# Patient Record
Sex: MALE | Race: WHITE
[De-identification: names, ages, dates, MRNs, and addresses within clinical notes are randomized per-mention and may not be internally consistent; named-entity substitution may affect disease eponyms.]

---

## 2017-07-15 NOTE — EDPHY
H & P


Time Seen by Provider: 07/15/17 16:47


HPI/ROS: 


CHIEF COMPLAINT:  Groin abscess 





HISTORY OF PRESENT ILLNESS: 


This patient is a 27 year old male with diabetes presenting with an abscess to 

his right groin area.  Onset of tenderness and swelling on the right side of 

his groin 1 week ago, gradually increasing since then.  The pain is moderate 

and increases with palpation.  Associated with small amount of drainage. No 

fever, vomiting, or other associated symptoms. Tetanus is up-to-date.





REVIEW OF SYSTEMS:


Constitutional: No fever, no chills


Eyes: No visual changes


ENT: No sore throat


Respiratory: No cough, no shortness of breath


Cardiac: No chest pain


Gastrointestinal: No nausea, no vomiting, no abdominal pain


Genitourinary: No hematuria, no dysuria


Musculoskeletal: No leg pain or swelling


Skin: No rash


Neurological: No headache, no weakness


Psychiatric: No depression





Past Medical/Surgical History: 


1. Diabetes


2. Hyperlipidemia


3. Depression


4. Anxiety 


Social History: 


Recently moved from [place]. 


Smoking Status: Current some day smoker


Physical Exam: 


General Appearance: Alert, pleasant


Respiratory:  Normal respiratory rate


Cardiovascular: Regular rate and rhythm 


Gastrointestinal:  Abdomen is soft and non-tender 


Genitourinary: 3cm abscess to right groin adjacent to the scrotum. 


Neurological:  A&O, nonfocal, normal gait


Skin:  Warm and dry, no rash


Extremities:  Normal inspection


Psychiatric:  Anxious





Constitutional: 


 Initial Vital Signs











Temperature (C)  36.8 C   07/15/17 16:23


 


Heart Rate  114 H  07/15/17 16:23


 


Respiratory Rate  20   07/15/17 16:23


 


Blood Pressure  140/89 H  07/15/17 16:23


 


O2 Sat (%)  95   07/15/17 16:23








 











O2 Delivery Mode               Room Air














Allergies/Adverse Reactions: 


 





No Known Allergies Allergy (Unverified 07/15/17 16:20)


 








Home Medications: 














 Medication  Instructions  Recorded


 


Adderall Xr 20 mg Capsule  07/15/17


 


Effexor Xr  07/15/17


 


KLONOPIN  07/15/17


 


Levemir  07/15/17


 


Lipitor 40 mg (*)  07/15/17


 


Metformin 1000 mg  07/15/17


 


Sulfamethox/Tmp 800/160 mg 1 tab PO BID #14 tab 07/15/17





[Bactrim Ds]  


 


Wellbutrin Sr  07/15/17


 


novoLOG  07/15/17














Medical Decision Making


Procedures: 


Procedure: Incision and Drainage abscess.





The patient's 3cm abscess was located on the right groin area. Risks, benefits, 

alternatives discussed with the patient and consent obtained. The area was 

prepped and draped in sterile fashion. The patient received local anesthesia 

with 1% lidocaine with epinephrine. The abscess was incised with a #11 blade 

and purulent drainage was expressed. The wound was packed. The patient 

tolerated the procedure well. The procedure was performed by myself.


ED Course/Re-evaluation: 


27 year old male presents with 3cm abscess to right groin area adjacent to 

scrotum. Plan for I&D. 





Patient tolerated the procedure well. He will be discharged home in good 

condition with prescription for Bactrim. Follow up and return precautions 

discussed. Patient questions answered. He is comfortable with this plan. 





Departure





- Departure


Disposition: Home, Routine, Self-Care


Clinical Impression: 


 Abscess





Condition: Good


Instructions:  Abscess (ED)


Additional Instructions: 


1. Take your Bactrim as prescribed. It is important that you finish your entire 

course of antibiotics. 





2. You may remove the packing in two days, or follow up at the emergency 

department or with a primary care provider for packing removal. We have 

referred you to our primary care provider on call. 





3. Return if you develop increasing pain, fever, or any other worsening of 

condition.





Referrals: 


CHRISTEN CRAFT [Other] - As per Instructions


Eric Little MD [Medical Doctor] - As per Instructions (Follow-up in 2 

days.)


Prescriptions: 


Sulfamethox/Tmp 800/160 mg [Bactrim Ds] 1 tab PO BID #14 tab


Report Scribed for: Ella Goodwin


Report Scribed by: Deepthi Sanchez


Date of Report: 07/15/17


Time of Report: 17:12


Physician Review and Approval Statement: 





07/15/17 17:12


Portions of this note were transcribed by a medical scribe.  I personally 

performed a history, physical exam, medical decision making, and confirmed 

accuracy of information the transcribed note.

## 2017-07-17 NOTE — EDPHY
H & P


Stated Complaint: here for wound recheck and packing removal of abcess


HPI/ROS: 





CHIEF COMPLAINT:  Right groin abscess recheck





HISTORY OF PRESENT ILLNESS:  Patient presents for recheck of a right groin 

abscess that was incised and drained 2 days ago.  There is packing in place.  

He has had some drainage that is purulent.  This is minimal amount.  No 

erythema.  His pain is significantly improved in the area.  There is no 

testicular pain.  No pain with urination.  No fever or chills.  He is diabetic 

on insulin and his blood sugars have been 180 to low 200s.  He has had no fever 

or chills. He has no associated complaints or modifying factors otherwise.





REVIEW OF SYSTEMS:


Ten systems reviewed and are negative unless otherwise noted in the HPI





PAST MEDICAL HISTORY:  Insulin-dependent diabetes





SOCIAL HISTORY:  Nonsmoker





FAMILY HISTORY:  Noncontributory





EXAMINATION


General Appearance:  Alert, no distress


Head: normocephalic, atraumatic


Gastrointestinal:  Abdomen is soft and nontender


Skin:  Warm and dry, no rash.  Right groin abscess that is status post incision 

and drainage with iodoform packing in place.  There is no fluctuance.  No 

surrounding erythema or edema.  No induration.  Minimal tenderness directly at 

the site.


:  Normal examination of the right hemiscrotum and penis.  No tenderness of 

the right testicle


Extremities:  Nontender, no pedal edema


Psychiatric:  Mood and affect normal





DIFFERENTIAL DIAGNOSES:


Including but not limited to groin abscess, complicated abscess





MDM:


10:30 a.m.


Right groin abscess.  This was packed 2 days ago.  The wound is very well-

appearing.  There is no fluctuance.  No drainage. No erythema.  No testicular 

pain or abnormality on examination. Packing will be removed.  He will be 

discharged home with daily wound care instructions.  I instructed him to 

contact his endocrinologist to discuss changing his sliding scale insulin as 

his blood sugars have been up above 180. He said that he will do this.  He is 

to return to ER for any worsening symptoms. I would like his wound to be 

rechecked again in 2-3 days.  He is comfortable this plan and discharged home 

stable condition.





SUPERVISION:


This patient was independently evaluated without direct examination by the 

attending physician. Case was discussed with attending physician. 


Source: Patient, Old records


Exam Limitations: No limitations





- Personal History


Current Tetanus/Diphtheria Vaccine: Yes


Current Tetanus Diphtheria and Acellular Pertussis (TDAP): Yes





- Medical/Surgical History


Hx Asthma: No


Hx Chronic Respiratory Disease: No


Hx Diabetes: Yes


Hx Cardiac Disease: Yes


Hx Renal Disease: No


Hx Cirrhosis: No


Hx Alcoholism: No


Hx HIV/AIDS: No


Hx Splenectomy or Spleen Trauma: No


Other PMH: DM I, HTN, HLD, depression, anxiety, panic attacks.





- Social History


Smoking Status: Current some day smoker


Constitutional: 





 Initial Vital Signs











Temperature (C)  97.9 F   07/17/17 10:12


 


Heart Rate  95   07/17/17 10:12


 


Respiratory Rate  16   07/17/17 10:12


 


Blood Pressure  146/90 H  07/17/17 10:12


 


O2 Sat (%)  97   07/17/17 10:12








 











O2 Delivery Mode               Room Air














Allergies/Adverse Reactions: 


 





No Known Allergies Allergy (Unverified 07/15/17 16:20)


 








Home Medications: 














 Medication  Instructions  Recorded


 


Adderall Xr 20 mg Capsule  07/15/17


 


Effexor Xr  07/15/17


 


KLONOPIN  07/15/17


 


Levemir  07/15/17


 


Lipitor 40 mg (*)  07/15/17


 


Metformin 1000 mg  07/15/17


 


Sulfamethox/Tmp 800/160 mg 1 tab PO BID #14 tab 07/15/17





[Bactrim Ds]  


 


Wellbutrin Sr  07/15/17


 


novoLOG  07/15/17














Departure





- Departure


Disposition: Home, Routine, Self-Care


Clinical Impression: 


 Abscess of groin, right, Insulin dependent diabetes mellitus





Condition: Good


Instructions:  Abscess (ED), Abscess Follow-up (ED)


Additional Instructions: 


1. Daily wound care as discussed


2. Contact her endocrinologist to discuss all treating her sliding scale insulin


3. Return to the ER for any return of symptoms, redness, testicular pain


4. Wound recheck in 2-3 days


Referrals: 


NONE *PRIMARY CARE P,. [Primary Care Provider] - As per Instructions


Yolanda Mckenna MD [[facility] Primary Care Provider] - As per Instructions

## 2017-07-27 NOTE — EDPHY
H & P


Time Seen by Provider: 07/27/17 17:21


HPI/ROS: 


CHIEF COMPLAINT:  Fever chills lower back pain





HISTORY OF PRESENT ILLNESS:  This is a 27-year-old male presenting to the 

emergency department complaining of fever chills onset at 0230, patient states 

he took some Advil this morning which did help some, but as the days progressed 

lower back pain with some burning urination.  Denies any nausea vomiting.  

Reports he 2 weeks ago is seen for abscess to right groin is resolving still on 

antibiotics no complications.





REVIEW OF SYSTEMS:


Constitutional:  Fever.  Chills


Eyes:  No discharge.


ENT:  No sore throat.


Cardiovascular:  No chest pain, no palpitations.


Respiratory:  No cough, no shortness of breath.


Gastrointestinal:  No abdominal pain, no vomiting.


Genitourinary:  Burning with urination


Musculoskeletal:  Lower back pain.


Skin:  No rashes.


Neurological:  No headache.


Smoking Status: Current some day smoker


Physical Exam: 


General Appearance:  Alert, no distress. Afebrile.  Non ill or toxic appearing


Eyes:  Pupils equal and round no pallor or injection.


ENT, Mouth:  Mucous membranes moist.


Respiratory:  There are no retractions, lungs are clear to auscultation.


Cardiovascular:  Regular rate and rhythm.


Gastrointestinal:  Abdomen is soft and nontender, no masses, bowel sounds 

normal.


Neurological:  No focal deficits.  Answering questions appropriately


Skin:  Warm and dry, no rashes.


Musculoskeletal:  Neck is supple nontender. No CVA tenderness on palpation


Extremities:  symmetrical, full range of motion.


Psychiatric:  Patient is oriented X 3, answering questions appropriately





Constitutional: 


 Initial Vital Signs











Temperature (C)  37.0 C   07/27/17 16:50


 


Heart Rate  110 H  07/27/17 16:50


 


Respiratory Rate  18   07/27/17 16:50


 


Blood Pressure  136/78 H  07/27/17 16:50


 


O2 Sat (%)  96   07/27/17 16:50








 











O2 Delivery Mode               Room Air














Allergies/Adverse Reactions: 


 





No Known Allergies Allergy (Unverified 07/27/17 16:48)


 








Home Medications: 














 Medication  Instructions  Recorded


 


Adderall Xr 20 mg Capsule  07/15/17


 


Effexor Xr  07/15/17


 


KLONOPIN  07/15/17


 


Levemir  07/15/17


 


Lipitor 40 mg (*)  07/15/17


 


Metformin 1000 mg  07/15/17


 


Sulfamethox/Tmp 800/160 mg 1 tab PO BID #14 tab 07/15/17





[Bactrim Ds]  


 


Wellbutrin Sr  07/15/17


 


novoLOG  07/15/17














Medical Decision Making


ED Course/Re-evaluation: 


Discussed ED plan of care:  UA, CBC, BMP





1830:  Discussed all lab results with patient.  Not in any distress stable





1845:  Discharge home---> stable, discussed all discharge instructions with 

patient. 


Differential Diagnosis: 


Other differential diagnosis considered but not limited to UTI, renal colic, 

kidney stones, DKA





- Data Points


Laboratory Results: 


 Laboratory Results





 07/27/17 18:06 





 07/27/17 18:06 





 











  07/27/17 07/27/17





  18:06 18:06


 


WBC    7.37 10^3/uL 10^3/uL





    (3.80-9.50) 


 


RBC    5.08 10^6/uL 10^6/uL





    (4.40-6.38) 


 


Hgb    14.4 g/dL g/dL





    (13.7-17.5) 


 


Hct    42.9 % %





    (40.0-51.0) 


 


MCV    84.4 fL fL





    (81.5-99.8) 


 


MCH    28.3 pg pg





    (27.9-34.1) 


 


MCHC    33.6 g/dL g/dL





    (32.4-36.7) 


 


RDW    12.8 % %





    (11.5-15.2) 


 


Plt Count    207 10^3/uL 10^3/uL





    (150-400) 


 


MPV    10.3 fL fL





    (8.7-11.7) 


 


Neut % (Auto)    81.4 % H %





    (39.3-74.2) 


 


Lymph % (Auto)    7.6 % L %





    (15.0-45.0) 


 


Mono % (Auto)    6.1 % %





    (4.5-13.0) 


 


Eos % (Auto)    3.7 % %





    (0.6-7.6) 


 


Baso % (Auto)    0.5 % %





    (0.3-1.7) 


 


Nucleat RBC Rel Count    0.0 % %





    (0.0-0.2) 


 


Absolute Neuts (auto)    6.00 10^3/uL 10^3/uL





    (1.70-6.50) 


 


Absolute Lymphs (auto)    0.56 10^3/uL L 10^3/uL





    (1.00-3.00) 


 


Absolute Monos (auto)    0.45 10^3/uL 10^3/uL





    (0.30-0.80) 


 


Absolute Eos (auto)    0.27 10^3/uL 10^3/uL





    (0.03-0.40) 


 


Absolute Basos (auto)    0.04 10^3/uL 10^3/uL





    (0.02-0.10) 


 


Absolute Nucleated RBC    0.00 10^3/uL 10^3/uL





    (0-0.01) 


 


Immature Gran %    0.7 % %





    (0.0-1.1) 


 


Immature Gran #    0.05 10^3/uL 10^3/uL





    (0.00-0.10) 


 


Sodium  135 mEq/L mEq/L  





   (134-144)  


 


Potassium  4.2 mEq/L mEq/L  





   (3.5-5.2)  


 


Chloride  99 mEq/L mEq/L  





   ()  


 


Carbon Dioxide  21 mEq/l L mEq/l  





   (22-31)  


 


Anion Gap  15 mEq/L mEq/L  





   (8-16)  


 


BUN  12 mg/dL mg/dL  





   (7-23)  


 


Creatinine  0.9 mg/dL mg/dL  





   (0.7-1.3)  


 


Estimated GFR  > 60   





   


 


Glucose  189 mg/dL H mg/dL  





   ()  


 


Calcium  10.0 mg/dL mg/dL  





   (8.5-10.4)  











Medications Given: 


 








Discontinued Medications





Lorazepam (Ativan)  1 mg PO EDNOW ONE


   Stop: 07/27/17 17:17


   Last Admin: 07/27/17 17:20 Dose:  1 mg








Departure





- Departure


Disposition: Home, Routine, Self-Care


Clinical Impression: 


Back pain


Qualifiers:


 Back pain location: low back pain Chronicity: acute Back pain laterality: 

bilateral Sciatica presence: without sciatica Qualified Code(s): M54.5 - Low 

back pain





Condition: Good


Instructions:  Acute Low Back Pain (ED)


Additional Instructions: 


1.  Follow up with your primary care provider tomorrow or next week


2.  Ibuprofen 600 mg stated mg every 6-8 hours as needed


3.  Continue monitoring your blood sugar


4.  If at any point time symptoms worsen, such as:  Nausea vomiting fevers 

greater than 101 on resolving with Tylenol ibuprofen shortness of breath chest 

pain return to the ER


Referrals: 


Abi Silverman MD [Primary Care Provider] - As per Instructions

## 2017-09-19 NOTE — EDPHY
H & P


Stated Complaint: N/V x 24 hours


Time Seen by Provider: 09/19/17 19:56


HPI/ROS: 


HPI:  This is a 27-year-old male who presents with 





Chief Complaint:Nausea and vomiting





Location:  GI


Quality:  nausea and Vomiting


Duration:  2 days


Signs and Symptoms:  No abdominal pain, no fever, no diarrhea, + nausea, + 

vomiting x4 times today, no blood in stool, no hematemesis, no abdominal bloody


Timing:  Sudden, intermittent


Severity:  Moderate


Context:  Patient is a type 1 diabetic on long-term insulin who complains of 

nausea and vomiting 4 times today.  Denies hematemesis/abdominal pain.  He 

reports that he has a cold for the past few days. Yesterday he started to 

experience intermittent nausea and vomiting started today. Denies abdominal 

pain.  Denies recent antibiotic use.  Denies concern for food poisoning.  

Reports his blood sugars have been running high.  He is scheduled to receive 

his insulin pump in the next month or so.  


Modifying Factors:  Has not tried any over-the-counter medications but has 

tried to sip liquids to prevent dehydration. 





Comment: 








ROS:


Constitutional: No fever, no chills, no weight loss


Eyes:  No blurred vision


Respiratory:  No shortness of breath, no cough


Cardiovascular:  No chest pain


Gastrointestinal:  + nausea, + vomiting, no diarrhea 


Genitourinary:  No dysuria 


Extremities:  No myalgias 


Neurologic:  No weakness, no numbness


Skin:  No rashes


Hematologic:  No bruising, no bleeding








MEDICAL/SURGICAL/SOCIAL: 


PMHx: DM I, HTN, HLD, depression, anxiety, panic attacks


PSHx: abscess drained, wisdom tooth extraction, hernia repair








CONSTITUTIONAL:  Overweight ill-appearing male, nontoxic, awake and alert, no 

obvious distress


HEENT: Atraumatic and normocephalic, PERRL, EOMI. Tympanic membranes clear. 

Oropharynx clear, no exudate and moist pink mucosa.  Airway patent.  No 

lymphadenopathy.  No meningismus.


Cardiovascular: Normal S1/S2, regular rate, regular rhythm, without murmur rub 

or gallop.


PULMONARY/CHEST:  Symmetrical and nontender. Clear to auscultation bilaterally. 

Good air movement. No accessory muscle usage.


ABDOMEN:  Soft, nondistended, nontender, no rebound, no guarding, no peritoneal 

signs, no masses or organomegaly. No CVAT.


EXTREMITIES:  2/2 pulses, no deformities, no clubbing, no cyanosis or edema.


NEUROLOGICAL: no focal neuro deficits.  GCS 15.


SKIN: Warm and dry, no erythema. no rash.  Good capillary refill. 








Source: Patient


Exam Limitations: No limitations





- Personal History


Current Tetanus/Diphtheria Vaccine: Yes





- Medical/Surgical History


Hx Asthma: No


Hx Chronic Respiratory Disease: No


Hx Diabetes: Yes


Hx Cardiac Disease: Yes


Hx Renal Disease: No


Hx Cirrhosis: No


Hx Alcoholism: No


Hx HIV/AIDS: No


Hx Splenectomy or Spleen Trauma: No


Other PMH: PMHx: DM I, HTN, HLD, depression, anxiety, panic attacks.  PSHx: 

abcess drained, wisdom tooth extraction, hernia repair





- Social History


Smoking Status: Current some day smoker


Constitutional: 


 Initial Vital Signs











Temperature (C)  36.5 C   09/19/17 19:30


 


Heart Rate  96   09/19/17 19:30


 


Respiratory Rate  16   09/19/17 19:30


 


Blood Pressure  165/96 H  09/19/17 19:30


 


O2 Sat (%)  95   09/19/17 19:30








 











O2 Delivery Mode               Room Air


 


O2 (L/minute)                  2














Allergies/Adverse Reactions: 


 





No Known Allergies Allergy (Unverified 07/27/17 16:48)


 








Home Medications: 














 Medication  Instructions  Recorded


 


Adderall Xr 20 mg Capsule  07/15/17


 


Effexor Xr  07/15/17


 


KLONOPIN  07/15/17


 


Levemir  07/15/17


 


Lipitor 40 mg (*)  07/15/17


 


Metformin 1000 mg  07/15/17


 


Sulfamethox/Tmp 800/160 mg 1 tab PO BID #14 tab 07/15/17





[Bactrim Ds]  


 


Wellbutrin Sr  07/15/17


 


novoLOG  07/15/17


 


Ondansetron Odt [Zofran Odt 4 mg 4 mg PO Q4 PRN #12 tab 09/19/17





(*)]  














Medical Decision Making


ED Course/Re-evaluation: 


Labs, IV fluids, IV medication, urinalysis ordered


Will evaluate for DKA.  Suspect viral gastroenteritis.


FSBS 237 upon arrival 


Mild leukocytosis noted, with mild abnormalities in anion gap and CO2.  Serum 

glucose is in the 200s.  Which is likely due to his viral infectious process. 


Given 2 L of fluid and IV Zofran with moderate relief


2100 Reassessed patient: no longer nauseous and reports that he feels 50% 

better. 


Discussed obtaining ultrasound or CT imaging with patient and he politely 

declines which I feel is reasonable at this time as is abdominal exam is benign 

and LFTs are normal. 


Magnesium level 1.2; given magnesium IV supplementation; etiology is due to GI 

losses


While receiving IV magnesium, nursing notified me that patient began to feel 

anxious.  IV Ativan 1 mg given


UA shows ketones and glucose. 


Passed PO trial. 


Differential Diagnosis: 


Upper abdominal pain including but not limited to cholecystitis, gastritis, 

peptic ulcers disease, and pancreatitis.








- Data Points


Laboratory Results: 


 Laboratory Results





 09/19/17 20:30 





 09/19/17 20:30 





 











  09/19/17 09/19/17 09/19/17





  21:15 20:30 20:30


 


WBC      13.06 10^3/uL H 10^3/uL





     (3.80-9.50) 


 


RBC      5.45 10^6/uL 10^6/uL





     (4.40-6.38) 


 


Hgb      15.4 g/dL g/dL





     (13.7-17.5) 


 


Hct      45.1 % %





     (40.0-51.0) 


 


MCV      82.8 fL fL





     (81.5-99.8) 


 


MCH      28.3 pg pg





     (27.9-34.1) 


 


MCHC      34.1 g/dL g/dL





     (32.4-36.7) 


 


RDW      13.3 % %





     (11.5-15.2) 


 


Plt Count      241 10^3/uL 10^3/uL





     (150-400) 


 


MPV      10.4 fL fL





     (8.7-11.7) 


 


Neut % (Auto)      86.8 % H %





     (39.3-74.2) 


 


Lymph % (Auto)      7.0 % L %





     (15.0-45.0) 


 


Mono % (Auto)      5.2 % %





     (4.5-13.0) 


 


Eos % (Auto)      0.1 % L %





     (0.6-7.6) 


 


Baso % (Auto)      0.2 % L %





     (0.3-1.7) 


 


Nucleat RBC Rel Count      0.0 % %





     (0.0-0.2) 


 


Absolute Neuts (auto)      11.35 10^3/uL H 10^3/uL





     (1.70-6.50) 


 


Absolute Lymphs (auto)      0.91 10^3/uL L 10^3/uL





     (1.00-3.00) 


 


Absolute Monos (auto)      0.68 10^3/uL 10^3/uL





     (0.30-0.80) 


 


Absolute Eos (auto)      0.01 10^3/uL L 10^3/uL





     (0.03-0.40) 


 


Absolute Basos (auto)      0.02 10^3/uL 10^3/uL





     (0.02-0.10) 


 


Absolute Nucleated RBC      0.00 10^3/uL 10^3/uL





     (0-0.01) 


 


Immature Gran %      0.7 % %





     (0.0-1.1) 


 


Immature Gran #      0.09 10^3/uL 10^3/uL





     (0.00-0.10) 


 


Sodium    136 mEq/L mEq/L  





    (134-144)  


 


Potassium    3.9 mEq/L mEq/L  





    (3.5-5.2)  


 


Chloride    99 mEq/L mEq/L  





    ()  


 


Carbon Dioxide    19 mEq/l L mEq/l  





    (22-31)  


 


Anion Gap    18 mEq/L H mEq/L  





    (8-16)  


 


BUN    12 mg/dL mg/dL  





    (7-23)  


 


Creatinine    0.6 mg/dL L mg/dL  





    (0.7-1.3)  


 


Estimated GFR    > 60   





    


 


Glucose    255 mg/dL H mg/dL  





    ()  


 


Calcium    10.0 mg/dL mg/dL  





    (8.5-10.4)  


 


Phosphorus    3.9 mg/dL mg/dL  





    (2.5-4.5)  


 


Magnesium    1.2 mg/dL L mg/dL  





    (1.6-2.3)  


 


Total Bilirubin    0.8 mg/dL mg/dL  





    (0.1-1.4)  


 


Conjugated Bilirubin    0.3 mg/dL mg/dL  





    (0.0-0.5)  


 


Unconjugated Bilirubin    0.5 mg/dL mg/dL  





    (0.0-1.1)  


 


AST    27 IU/L IU/L  





    (17-59)  


 


ALT    52 IU/L IU/L  





    (21-72)  


 


Alkaline Phosphatase    94 IU/L IU/L  





    ()  


 


Total Protein    7.6 g/dL g/dL  





    (6.3-8.2)  


 


Albumin    4.8 g/dL g/dL  





    (3.5-5.0)  


 


Lipase    48 IU/L IU/L  





    ()  


 


Beta-Hydroxybutyrate    1.15 mmol/L H mmol/L  





    (0.02-0.27)  


 


Urine Color  YELLOW     





    


 


Urine Appearance  CLEAR     





    


 


Urine pH  6.0     





   (5.0-7.5)   


 


Ur Specific Gravity  1.017     





   (1.002-1.030)   


 


Urine Protein  NEGATIVE     





   (NEGATIVE)   


 


Urine Ketones  2+  H     





   (NEGATIVE)   


 


Urine Blood  NEGATIVE     





   (NEGATIVE)   


 


Urine Nitrate  NEGATIVE     





   (NEGATIVE)   


 


Urine Bilirubin  NEGATIVE     





   (NEGATIVE)   


 


Urine Urobilinogen  NEGATIVE EU EU    





   (0.2-1.0)   


 


Ur Leukocyte Esterase  NEGATIVE     





   (NEGATIVE)   


 


Urine RBC  1-3 /hpf /hpf    





   (0-3)   


 


Urine WBC  1-3 /hpf /hpf    





   (0-3)   


 


Ur Epithelial Cells  TRACE /lpf /lpf    





   (NONE-1+)   


 


Urine Glucose  3+  H     





   (NEGATIVE)   











Medications Given: 


 








Discontinued Medications





Sodium Chloride (Ns)  1,000 mls @ 0 mls/hr IV EDNOW ONE; Wide Open


   PRN Reason: Protocol


   Stop: 09/19/17 20:02


   Last Admin: 09/19/17 20:34 Dose:  1,000 mls


Sodium Chloride (Ns)  1,000 mls @ 0 mls/hr IV EDNOW ONE; Wide Open


   PRN Reason: Protocol


   Stop: 09/19/17 20:02


   Last Admin: 09/19/17 21:32 Dose:  1,000 mls


Magnesium Sulfate/Dextrose (Magnesium Sulf 1 Gm (Premix))  100 mls @ 100 mls/hr 

IV EDNOW ONE


   Stop: 09/19/17 21:55


   Last Admin: 09/19/17 21:11 Dose:  100 mls


Lorazepam (Ativan Injection)  1 mg IVP EDNOW ONE


   Stop: 09/19/17 21:28


   Last Admin: 09/19/17 21:30 Dose:  1 mg


Ondansetron HCl (Zofran)  4 mg IVP EDNOW ONE


   Stop: 09/19/17 20:02


   Last Admin: 09/19/17 20:34 Dose:  4 mg


Ondansetron HCl (Zofran Odt 4 Mg Prepack#2)  1 btl TAKEHOME EDNOW ONE


   Stop: 09/19/17 22:07


   Last Admin: 09/19/17 22:08 Dose:  1 btl


Promethazine HCl (Phenergan)  25 mg IVP EDNOW ONE


   Stop: 09/19/17 22:04


   Last Admin: 09/19/17 22:08 Dose:  25 mg








Departure





- Departure


Disposition: Home, Routine, Self-Care


Clinical Impression: 


 Hyperglycemia due to type 1 diabetes mellitus, Gastroenteritis





Condition: Good


Instructions:  Gastroenteritis (ED)


Additional Instructions: 


Please have repeat labs drawn in 3-5 days to document normalization of your 

magnesium level. 


Rest as much as possible and drink plenty of fluids. 


Take insulin as prescribed. 


Referrals: 


Abi Silverman MD [Primary Care Provider] - As per Instructions


Prescriptions: 


Ondansetron Odt [Zofran Odt 4 mg (*)] 4 mg PO Q4 PRN #12 tab


 PRN Reason: Nausea/Vomiting, Use 1st

## 2017-11-12 NOTE — EDPHY
H & P


Time Seen by Provider: 11/12/17 10:36


HPI/ROS: 





CHIEF COMPLAINT:  Anxiety





HISTORY OF PRESENT ILLNESS:  27-year-old male presents to the emergency 

department feeling extremely anxious.  The patient is a type 1 diabetic on 

insulin.  He has not seen a primary care provider in quite some time.  His last 

hemoglobin A1c was a few months ago and was over 9. He feels nauseous and has 

been vomiting.  He states that he was in intensive outpatient program for 

depression and anxiety and "amongst other things "and then the program went out 

of business.  This ended few months ago and since that time he is having a 

great deal of difficulty coping.  He feels extremely anxious.  He is not 

suicidal or homicidal.  "I just want to feel better.  "He does not abuse drugs 

or alcohol.





REVIEW OF SYSTEMS:


Constitutional:  No fever, no chills.


Eyes:  No double or blurry vision.


ENT:  No sore throat.


Respiratory:  No cough, no shortness of breath.


Cardiac:  No chest pain.


Gastrointestinal:  Vomiting.  Diffuse abdominal pain.  No diarrhea.


Genitourinary:  No dysuria.


Musculoskeletal:  No neck or back pain.


Skin:  No rashes.


Neurological:  No headache.


Past Medical/Surgical History: 





Insulin-dependent diabetic, anxiety, depression


Social History: 





Single


Smoking Status: Current every day smoker


Physical Exam: 





General Appearance:  Alert, obvious distress.  Hyperventilating, appears 

extremely anxious.


Eyes:  Pupils equal and round.  Extraocular motions are all intact.


ENT:  Mouth:  Mucous membranes very dry.


Respiratory:  No wheezing, rhonchi, or rales, lungs are clear to auscultation.


Cardiovascular:  Regular rate and rhythm.


Gastrointestinal:  Abdomen is soft and nontender, no masses, no rebound or 

guarding, bowel sounds normal.


Neurological:  Alert and oriented x 3, cranial nerves II through XII grossly 

intact


Skin:  Warm and dry, no rashes.


Musculoskeletal:  Nontender to palpate along the cervical, thoracic or lumbar 

spine.  Neck is supple.


Extremities:  Full range of motion and no peripheral edema.


Psychiatric:  Patient is oriented X 3, there is no agitation.


Constitutional: 


 Initial Vital Signs











Temperature (C)  36.9 C   11/12/17 09:50


 


Heart Rate  118 H  11/12/17 09:50


 


Respiratory Rate  26 H  11/12/17 09:50


 


Blood Pressure  188/70 H  11/12/17 09:50


 


O2 Sat (%)  99   11/12/17 09:50








 











O2 Delivery Mode               Room Air














Allergies/Adverse Reactions: 


 





No Known Allergies Allergy (Unverified 07/27/17 16:48)


 








Home Medications: 














 Medication  Instructions  Recorded


 


Insulin Pump Cartridge  11/12/17


 


LORazepam [Ativan] 1 mg PO Q6-8PRN PRN #10 tab 11/12/17














Medical Decision Making


ED Course/Re-evaluation: 





Patient had IV established and was given 2 mg total of IV Ativan as well as IV 

normal saline.  He was observed.  His laboratory studies were within normal 

limits.  CO2 was low likely from hyperventilation.





The patient does not feel suicidal homicidal.  Initially he wanted to speak 

with somebody for mental health.  Upon discharge, however the patient would 

like to be discharged home.  He is not feeling suicidal or homicidal.  He was 

discharged home with information for [facility].


Differential Diagnosis: 





Including but not limited to anxiety, dehydration, electrolyte abnormality, 

depression, suicidal ideation





- Data Points


Laboratory Results: 


 Laboratory Results





 11/12/17 10:25 





 11/12/17 10:25 





 











  11/12/17 11/12/17 11/12/17





  10:25 10:25 10:25


 


WBC      10.54 10^3/uL H 10^3/uL





     (3.80-9.50) 


 


RBC      5.41 10^6/uL 10^6/uL





     (4.40-6.38) 


 


Hgb      15.8 g/dL g/dL





     (13.7-17.5) 


 


POC Hgb      





    


 


Hct      43.7 % %





     (40.0-51.0) 


 


POC Hct      





    


 


MCV      80.8 fL L fL





     (81.5-99.8) 


 


MCH      29.2 pg pg





     (27.9-34.1) 


 


MCHC      36.2 g/dL g/dL





     (32.4-36.7) 


 


RDW      13.5 % %





     (11.5-15.2) 


 


Plt Count      280 10^3/uL 10^3/uL





     (150-400) 


 


MPV      10.2 fL fL





     (8.7-11.7) 


 


Neut % (Auto)      81.3 % H %





     (39.3-74.2) 


 


Lymph % (Auto)      11.3 % L %





     (15.0-45.0) 


 


Mono % (Auto)      4.8 % %





     (4.5-13.0) 


 


Eos % (Auto)      1.4 % %





     (0.6-7.6) 


 


Baso % (Auto)      0.6 % %





     (0.3-1.7) 


 


Nucleat RBC Rel Count      0.0 % %





     (0.0-0.2) 


 


Absolute Neuts (auto)      8.57 10^3/uL H 10^3/uL





     (1.70-6.50) 


 


Absolute Lymphs (auto)      1.19 10^3/uL 10^3/uL





     (1.00-3.00) 


 


Absolute Monos (auto)      0.51 10^3/uL 10^3/uL





     (0.30-0.80) 


 


Absolute Eos (auto)      0.15 10^3/uL 10^3/uL





     (0.03-0.40) 


 


Absolute Basos (auto)      0.06 10^3/uL 10^3/uL





     (0.02-0.10) 


 


Absolute Nucleated RBC      0.00 10^3/uL 10^3/uL





     (0-0.01) 


 


Immature Gran %      0.6 % %





     (0.0-1.1) 


 


Immature Gran #      0.06 10^3/uL 10^3/uL





     (0.00-0.10) 


 


VBG Lactic Acid  3.4 mmol/L H mmol/L    





   (0.7-2.1)   


 


POC Sodium      





    


 


Sodium    141 mEq/L mEq/L  





    (134-144)  


 


POC Potassium      





    


 


Potassium    3.8 mEq/L mEq/L  





    (3.5-5.2)  


 


POC Chloride      





    


 


Chloride    106 mEq/L mEq/L  





    ()  


 


Carbon Dioxide    17 mEq/l L mEq/l  





    (22-31)  


 


Anion Gap    18 mEq/L H mEq/L  





    (8-16)  


 


POC BUN      





    


 


BUN    12 mg/dL mg/dL  





    (7-23)  


 


Creatinine    0.7 mg/dL mg/dL  





    (0.7-1.3)  


 


POC Creatinine      





    


 


Estimated GFR    > 60   





    


 


Glucose    289 mg/dL H mg/dL  





    ()  


 


POC Glucose      





    


 


Calcium    9.9 mg/dL mg/dL  





    (8.5-10.4)  














  11/12/17





  10:24


 


WBC  





  


 


RBC  





  


 


Hgb  





  


 


POC Hgb  16.0 gm/dL gm/dL





   (13.7-17.5) 


 


Hct  





  


 


POC Hct  47 % %





   (40-51) 


 


MCV  





  


 


MCH  





  


 


MCHC  





  


 


RDW  





  


 


Plt Count  





  


 


MPV  





  


 


Neut % (Auto)  





  


 


Lymph % (Auto)  





  


 


Mono % (Auto)  





  


 


Eos % (Auto)  





  


 


Baso % (Auto)  





  


 


Nucleat RBC Rel Count  





  


 


Absolute Neuts (auto)  





  


 


Absolute Lymphs (auto)  





  


 


Absolute Monos (auto)  





  


 


Absolute Eos (auto)  





  


 


Absolute Basos (auto)  





  


 


Absolute Nucleated RBC  





  


 


Immature Gran %  





  


 


Immature Gran #  





  


 


VBG Lactic Acid  





  


 


POC Sodium  139 mEq/L mEq/L





   (134-144) 


 


Sodium  





  


 


POC Potassium  3.5 mEq/L mEq/L





   (3.3-5.0) 


 


Potassium  





  


 


POC Chloride  106 mEq/L mEq/L





   () 


 


Chloride  





  


 


Carbon Dioxide  





  


 


Anion Gap  





  


 


POC BUN  12 mg/dL mg/dL





   (7-23) 


 


BUN  





  


 


Creatinine  





  


 


POC Creatinine  0.7 mg/dL mg/dL





   (0.7-1.3) 


 


Estimated GFR  





  


 


Glucose  





  


 


POC Glucose  301 mg/dL H mg/dL





   () 


 


Calcium  





  











Medications Given: 


 








Discontinued Medications





Sodium Chloride (Ns)  1,000 mls @ 0 mls/hr IV EDNOW ONE; Wide Open


   PRN Reason: Protocol


   Stop: 11/12/17 10:43


   Last Admin: 11/12/17 10:47 Dose:  1,000 mls


Sodium Chloride (Ns)  1,000 mls @ 0 mls/hr IV ONCE ONE


   PRN Reason: Wide Open


   Stop: 11/12/17 11:29


   Last Admin: 11/12/17 11:37 Dose:  1,000 mls


Lorazepam (Ativan Injection)  1 mg IVP EDNOW ONE


   Stop: 11/12/17 11:11


   Last Admin: 11/12/17 11:13 Dose:  1 mg


Lorazepam (Ativan Injection)  1 mg IVP EDNOW ONE


   Stop: 11/12/17 11:29


   Last Admin: 11/12/17 11:36 Dose:  1 mg


Ondansetron HCl (Zofran)  4 mg IVP EDNOW ONE


   Stop: 11/12/17 10:45


   Last Admin: 11/12/17 10:47 Dose:  4 mg





Point of Care Test Results: 


 











  11/12/17





  10:24


 


POC Sodium  139


 


POC Potassium  3.5


 


POC Chloride  106


 


POC BUN  12


 


POC Creatinine  0.7


 


POC Glucose  301 H














Departure





- Departure


Disposition: Home, Routine, Self-Care


Clinical Impression: 


 Anxiety, Dehydration





Vomiting


Qualifiers:


 Vomiting type: unspecified Vomiting Intractability: non-intractable Nausea 

presence: with nausea Qualified Code(s): R11.2 - Nausea with vomiting, 

unspecified





Condition: Good


Instructions:  Generalized Anxiety Disorder (ED), Acute Nausea and Vomiting (ED)


Additional Instructions: 


Ativan as needed for anxiety.  Return to the emergency department if you 

developed pain in her chest, difficulty breathing, or if you feel worse in any 

way.


Referrals: 


[facility],. [Clinic] - As per Instructions


Prescriptions: 


LORazepam [Ativan] 1 mg PO Q6-8PRN PRN #10 tab


 PRN Reason: Anxiety

## 2018-05-13 NOTE — EDPHY
H & P


Time Seen by Provider: 05/13/18 09:53


HPI/ROS: 





CHIEF COMPLAINT:  I am having a panic attack





HISTORY OF PRESENT ILLNESS:  28-year-old man has insulin-dependent diabetes and 

what he tells me is a history of reflux and possible ulcer.  He started getting 

epigastric mild abdominal pain last night associated with belching and burping.

  Today he had an episode of vomiting at home and started getting a panic 

attack with hyperventilation and feeling"out of it."He took a 0.5 mg Klonopin 

and presents to the ED for evaluation.  His glucose was 266 at 8:00 a.m..


Patient says he has a history of panic attacks.  When asked what he thinks 

would help with his symptoms is requesting treatment for his panic attack as 

well as a refill of his omeprazole.


Denies diarrhea or alcohol ingestion or hematemesis or coffee-ground emesis or 

melena.





REVIEW OF SYSTEMS:


Eye: no change in vision


ENT: no sore throat


Cardiac: no chest pain or syncope


Pulmonary: no cough or SOB


Abdomen:  HPI


Musculoskeletal: no back pain


Skin: no rash


Neuro: no headache


Constitutional: no fever


: no urinary symptoms





A comprehensive 10 point review of systems is otherwise negative aside from 

elements mentioned in the history of present illness.





PAST MEDICAL HISTORY:  Includes diabetes, hypertension, depression and anxiety, 

panic attacks.  Hernia repair.





Social history:  Tobacco smoker





General Appearance: Alert and conversant, cooperative.


Eyes: No scleral  icterus. 


ENT, Mouth: Normal mucous membranes.  No angioedema.


Respiratory: Normal respiratory effort, breath sounds equal, lungs are clear to 

auscultation.  No wheezing.


Cardiovascular:  Regular rate and rhythm.


Gastrointestinal:  Abdomen is soft and non tender.  Negative Ramachandran sign.


Neurological: Alert, face symmetric, normal motor and sensory in extremities. 


Skin: Warm and dry, no rashes.


Musculoskeletal: No peripheral edema.


Psychiatric:  Patient appears quite anxious.





Emergency Department course/MDM:





I think ketoacidosis is unlikely.  He does not appear to have high likelihood 

for GI bleed or cholecystitis or hepatitis or pancreatitis or other acute 

medical or surgical emergent condition.  More likely reflux and panic attack.


GI cocktail, oral Ativan, Zofran ODT.


1110:  Feels better, wants discharge which I think is reasonable.  Prescription 

for Zofran and omeprazole at his request.


Smoking Status: Current every day smoker


Constitutional: 


 Initial Vital Signs











Temperature (C)  36.7 C   05/13/18 09:50


 


Heart Rate  82   05/13/18 09:50


 


Respiratory Rate  18   05/13/18 09:50


 


Blood Pressure  163/108 H  05/13/18 09:50


 


O2 Sat (%)  99   05/13/18 09:50








 











O2 Delivery Mode               Room Air














Allergies/Adverse Reactions: 


 





No Known Allergies Allergy (Verified 05/13/18 09:49)


 








Home Medications: 














 Medication  Instructions  Recorded


 


Insulin Pump Cartridge  11/12/17


 


ADDERALL 15 MG TABLET  05/13/18


 


Cymbalta  05/13/18


 


Effexor Xr  05/13/18


 


Omeprazole 20 mg PO HS #14 tablet. 05/13/18


 


Ondansetron Odt [Zofran Odt] 4 mg PO Q4PRN #10 tab 05/13/18


 


Wellbutrin Sr  05/13/18














Medical Decision Making


Differential Diagnosis: 





Differential considered including but not limited to pancreatitis, cholecystitis

, GI bleed, hyperventilation, panic attack or anxiety, pulmonary embolism, 

pneumonia.





- Data Points


Medications Given: 


 








Discontinued Medications





Al Hydroxide/Mg Hydroxide (Maalox Susp)  30 ml PO ONCE ONE


   Stop: 05/13/18 10:09


   Last Admin: 05/13/18 10:13 Dose:  30 ml


Hyoscyamine Sulfate (Levsin, Hyomax-Sl)  0.25 mg PO ONCE ONE


   Stop: 05/13/18 10:09


   Last Admin: 05/13/18 10:12 Dose:  0.25 mg


Lidocaine (Lidocaine 2% Viscous)  15 ml PO ONCE ONE


   Stop: 05/13/18 10:09


   Last Admin: 05/13/18 10:13 Dose:  15 ml


Lorazepam (Ativan)  1 mg PO EDNOW ONE


   Stop: 05/13/18 10:08


   Last Admin: 05/13/18 10:12 Dose:  1 mg


Ondansetron HCl (Zofran Odt)  4 mg PO EDNOW ONE


   Stop: 05/13/18 10:08


   Last Admin: 05/13/18 10:12 Dose:  4 mg








Departure





- Departure


Disposition: Home, Routine, Self-Care


Clinical Impression: 


 Panic attack





GERD (gastroesophageal reflux disease)


Qualifiers:


 Esophagitis presence: esophagitis presence not specified Qualified Code(s): 

K21.9 - Gastro-esophageal reflux disease without esophagitis





Condition: Good


Instructions:  Omeprazole (By mouth), Panic Attack (ED)


Referrals: 


Abi Silverman MD [Primary Care Provider] - As per Instructions


Prescriptions: 


Omeprazole 20 mg PO HS #14 tablet.


Ondansetron Odt [Zofran Odt] 4 mg PO Q4PRN #10 tab

## 2018-05-15 NOTE — EDPHY
H & P


Time Seen by Provider: 05/15/18 07:19


HPI/ROS: 





Chief complaint. Dehydrated





HPI.  Patient 28-year-old male who initially complains of dehydration.  However 

now he tells me he thinks he has is having a panic attack.  He was given 

lorazepam orally prior to my seeing the patient he feels much better.  He tells 

me had vomiting 2 days ago but not since.  He has had low energy.  No fever.  

Slight cough.  No abdominal pain vomiting or diarrhea in the last 2 days.  He 

has an insulin pump that he says he is using and seems to be working okay.  He 

was seen May 13th for panic attack.





ROS


Constitutional.  Low energy


Eyes.  no problems with vision


ENT.  no sore throat, no nasal drainage


Cardiovascular.  no chest pain


Respiratory.  no shortness of breath, no cough


Abdominal.  no abdominal pain, no nausea/vomiting, no diarrhea


.  no problems urinating


MS.  no calf pain/swelling, no neck/back pain, no joint pain


Skin.  no rash


Lymph.  no swollen glands


Neuro.  no headache, no dizziness, no difficulty walking or with speech


Past Medical/Surgical History: 





Diabetes with insulin pump, hypertension, depression, anxiety, panic attacks


Social History: 





Single, daily smoker, no alcohol


Smoking Status: Heavy smoker


Physical Exam: 





General Appearance:  Alert well-developed male mild distress vital signs are 

stable


Eyes: Pupils equal and round no pallor or injection.


ENT, Mouth:  Mucous membranes are moist.


Respiratory:  There are no retractions, lungs are clear to auscultation.


Cardiovascular: Regular rate and rhythm.


Gastrointestinal:   Abdomen is soft and nontender, no masses, bowel sounds 

normal.


Neurological:  Awake and alert, sensory and motor exams grossly normal.


Skin: Warm and dry, no rashes.


Musculoskeletal:  Neck is supple nontender.


Extremities  symmetrical, full range of motion.


Psychiatric: Patient is oriented X 3, there is no agitation.


Constitutional: 


 Initial Vital Signs











Temperature (C)  36.3 C   05/15/18 06:28


 


Heart Rate  98   05/15/18 06:28


 


Respiratory Rate  18   05/15/18 06:28


 


Blood Pressure  163/96 H  05/15/18 06:28


 


O2 Sat (%)  96   05/15/18 06:28








 











O2 Delivery Mode               Room Air














Allergies/Adverse Reactions: 


 





No Known Allergies Allergy (Verified 05/15/18 06:30)


 








Home Medications: 














 Medication  Instructions  Recorded


 


Insulin Pump Cartridge  11/12/17


 


ADDERALL 15 MG TABLET  05/13/18


 


Cymbalta  05/13/18


 


Effexor Xr  05/13/18


 


Omeprazole 20 mg PO HS #14 tablet. 05/13/18


 


Ondansetron Odt [Zofran Odt] 4 mg PO Q4PRN #10 tab 05/13/18


 


Wellbutrin Sr  05/13/18


 


Klonopin  05/15/18














Medical Decision Making


Procedures: 





IV normal saline.  Patient had already been given a lorazepam and he is feeling 

better.


ED Course/Re-evaluation: 





Re-evaluation again at 8:20 a.m..  Patient is stable.  He and I discussed 

laboratory evaluation and elevated blood sugar.  I asked him again about 

insulin pump and he feels that it is working fine.  He is up to the bathroom to 

urinate.





Re-evaluation again at 9:00 a.m..  Patient and I discussed laboratory evaluation

, treatment plan including criteria for return importance of follow-up and 

further evaluation.  He expresses understanding and agreement


Differential Diagnosis: 





I do not think the patient has DKA.  No significant findings for dehydration.  

His symptoms are improved after taking lorazepam.  I think that this is likely 

anxiety.  It is unclear why the patient's blood sugar is elevated at 230 as the 

pump seems to be working.





- Data Points


Laboratory Results: 


 Laboratory Results





 05/15/18 07:40 





 05/15/18 07:40 





 











  05/15/18 05/15/18





  07:40 07:40


 


WBC    10.63 10^3/uL H 10^3/uL





    (3.80-9.50) 


 


RBC    5.57 10^6/uL 10^6/uL





    (4.40-6.38) 


 


Hgb    16.0 g/dL g/dL





    (13.7-17.5) 


 


Hct    45.9 % %





    (40.0-51.0) 


 


MCV    82.4 fL fL





    (81.5-99.8) 


 


MCH    28.7 pg pg





    (27.9-34.1) 


 


MCHC    34.9 g/dL g/dL





    (32.4-36.7) 


 


RDW    13.4 % %





    (11.5-15.2) 


 


Plt Count    276 10^3/uL 10^3/uL





    (150-400) 


 


MPV    9.9 fL fL





    (8.7-11.7) 


 


Neut % (Auto)    73.1 % %





    (39.3-74.2) 


 


Lymph % (Auto)    16.9 % %





    (15.0-45.0) 


 


Mono % (Auto)    8.4 % %





    (4.5-13.0) 


 


Eos % (Auto)    0.5 % L %





    (0.6-7.6) 


 


Baso % (Auto)    0.5 % %





    (0.3-1.7) 


 


Nucleat RBC Rel Count    0.0 % %





    (0.0-0.2) 


 


Absolute Neuts (auto)    7.78 10^3/uL H 10^3/uL





    (1.70-6.50) 


 


Absolute Lymphs (auto)    1.80 10^3/uL 10^3/uL





    (1.00-3.00) 


 


Absolute Monos (auto)    0.89 10^3/uL H 10^3/uL





    (0.30-0.80) 


 


Absolute Eos (auto)    0.05 10^3/uL 10^3/uL





    (0.03-0.40) 


 


Absolute Basos (auto)    0.05 10^3/uL 10^3/uL





    (0.02-0.10) 


 


Absolute Nucleated RBC    0.00 10^3/uL 10^3/uL





    (0-0.01) 


 


Immature Gran %    0.6 % %





    (0.0-1.1) 


 


Immature Gran #    0.06 10^3/uL 10^3/uL





    (0.00-0.10) 


 


Sodium  137 mEq/L mEq/L  





   (135-145)  


 


Potassium  4.2 mEq/L mEq/L  





   (3.3-5.0)  


 


Chloride  99 mEq/L mEq/L  





   ()  


 


Carbon Dioxide  23 mEq/l mEq/l  





   (22-31)  


 


Anion Gap  15 mEq/L mEq/L  





   (8-16)  


 


BUN  14 mg/dL mg/dL  





   (7-23)  


 


Creatinine  0.7 mg/dL mg/dL  





   (0.7-1.3)  


 


Estimated GFR  > 60   





   


 


Glucose  232 mg/dL H mg/dL  





   ()  


 


Calcium  9.6 mg/dL mg/dL  





   (8.5-10.4)  











Medications Given: 


 








Discontinued Medications





Sodium Chloride (Ns)  1,000 mls @ 0 mls/hr IV EDNOW ONE; Wide Open


   PRN Reason: Protocol


   Stop: 05/15/18 07:26


   Last Admin: 05/15/18 07:36 Dose:  1,000 mls


Lorazepam (Ativan)  1 mg PO EDNOW ONE


   Stop: 05/15/18 06:47


   Last Admin: 05/15/18 06:48 Dose:  1 mg








Departure





- Departure


Disposition: Home, Routine, Self-Care


Clinical Impression: 


 Anxiety, Hyperglycemia due to type 1 diabetes mellitus





Condition: Good


Instructions:  Diabetic Hyperglycemia (ED)


Additional Instructions: 


Drink plenty of fluids and stay hydrated.  Monitor your blood sugars and make 

sure your insulin pump is functioning.  Return for worsening symptoms including 

abdominal pain, vomiting, diarrhea.  Recheck in 2 days without fail


Referrals: 


NONE *PRIMARY CARE P,. [Primary Care Provider] - As per Instructions


Abi Silverman MD [[facility] Primary Care Provider] - 2-3 days, call for appt.

## 2018-10-15 ENCOUNTER — HOSPITAL ENCOUNTER (EMERGENCY)
Dept: HOSPITAL 80 - FED | Age: 28
Discharge: HOME | End: 2018-10-15
Payer: COMMERCIAL

## 2018-10-15 VITALS — DIASTOLIC BLOOD PRESSURE: 69 MMHG | SYSTOLIC BLOOD PRESSURE: 150 MMHG

## 2018-10-15 DIAGNOSIS — Z79.4: ICD-10-CM

## 2018-10-15 DIAGNOSIS — R11.2: Primary | ICD-10-CM

## 2018-10-15 DIAGNOSIS — F17.200: ICD-10-CM

## 2018-10-15 DIAGNOSIS — E86.0: ICD-10-CM

## 2018-10-15 DIAGNOSIS — E10.9: ICD-10-CM

## 2018-10-15 LAB
PLATELET # BLD: (no result) 10^3/UL (ref 150–400)
PLATELET # BLD: (no result) 10^3/UL (ref 150–400)

## 2018-10-15 NOTE — EDPHY
H & P


Stated Complaint: N/V today; BS running high


Time Seen by Provider: 10/15/18 15:01


HPI/ROS: 





CHIEF COMPLAINT:  Vomiting





HISTORY OF PRESENT ILLNESS:  28-year-old male with IDDM presents with vomiting.

  Onset of nausea at 0500. Multiple episodes of vomiting and inability tolerate 

oral fluids.  Associated with increased anxiety.  Blood sugar 200-220 this 

morning.  Has an insulin pump.  No fever, diarrhea or abdominal pain.





REVIEW OF SYSTEMS:  complete 10 point ROS reviewed and is negative except for 

the noted elements in the HPI








- Personal History


Current Tetanus Diphtheria and Acellular Pertussis (TDAP): Yes


Tetanus Vaccine Date: 2011





- Medical/Surgical History


Hx Asthma: No


Hx Chronic Respiratory Disease: No


Hx Diabetes: Yes


Hx Cardiac Disease: Yes


Hx Renal Disease: No


Hx Cirrhosis: No


Hx Alcoholism: No


Hx HIV/AIDS: No


Hx Splenectomy or Spleen Trauma: No


Other PMH: PMHx: DM I, HTN, HLD, depression, anxiety, panic attacks.  PSHx: 

abcess drained, wisdom tooth extraction, hernia repair





- Social History


Smoking Status: Current every day smoker


Alcohol Use: Sober





- Physical Exam


Exam: 





General Appearance:  Alert, cooperative, fidgety


Eyes:  Pupils equal and round, no conjunctival pallor or injection


ENT, Mouth:  Mucous membranes moist


Neck:  Normal inspection


Respiratory:  Lungs are clear to auscultation


Cardiovascular:  Regular rate and rhythm


Gastrointestinal:  Abdomen is soft and nontender


Neurological:  A&O, nonfocal, normal gait


Skin:  Warm and dry, no rash


Extremities:  Normal inspection


Psychiatric:  Anxious





Constitutional: 


 Initial Vital Signs











Temperature (C)  37 C   10/15/18 14:20


 


Heart Rate  88   10/15/18 14:20


 


Respiratory Rate  18   10/15/18 14:20


 


Blood Pressure  147/78 H  10/15/18 14:20


 


O2 Sat (%)  97   10/15/18 14:20








 











O2 Delivery Mode               Room Air














Allergies/Adverse Reactions: 


 





No Known Allergies Allergy (Verified 10/15/18 14:19)


 








Home Medications: 














 Medication  Instructions  Recorded


 


Insulin Pump Cartridge  11/12/17


 


ADDERALL 15 MG TABLET  05/13/18


 


Cymbalta  05/13/18


 


Wellbutrin Sr  05/13/18


 


Klonopin  05/15/18














Medical Decision Making


ED Course/Re-evaluation: 





This diabetic patient presents with vomiting.  IV normal saline 2 L and Zofran 

4 mg IV given.  Ativan 0.5 mg IV given for anxiety.  Glucose 213, not in DKA. 

HCO3 18 secondary to dehydration.  





4:30 p.m.-feels better, nausea has resolved.  Continues to feel slightly anxious

, declines further Ativan.  Tolerated po fluid trial.  Abdomen remained soft 

and nontender.  Discharge instructions given.


Differential Diagnosis: 





Differential diagnosis includes though it is not limited to appendicitis, 

cholecystitis, diverticulitis, pyelonephritis, bowel perforation, small bowel 

obstruction.








- Data Points


Laboratory Results: 


 Laboratory Results





 10/15/18 16:14 





 10/15/18 15:20 





 











  10/15/18 10/15/18 10/15/18





  16:14 15:20 15:20


 


WBC  REJ     REJ 





    


 


RBC  TNP     TNP 





    


 


Hgb  TNP     TNP 





    


 


POC Hgb      





    


 


Hct  TNP     TNP 





    


 


POC Hct      





    


 


MCV  TNP     TNP 





    


 


MCH  TNP     TNP 





    


 


MCHC  TNP     TNP 





    


 


RDW  TNP     TNP 





    


 


Plt Count  TNP     TNP 





    


 


MPV  TNP     TNP 





    


 


Neut % (Auto)  TNP     TNP 





    


 


Lymph % (Auto)  TNP     TNP 





    


 


Mono % (Auto)  TNP     TNP 





    


 


Eos % (Auto)  TNP     TNP 





    


 


Baso % (Auto)  TNP     TNP 





    


 


Nucleat RBC Rel Count  TNP     TNP 





    


 


Absolute Neuts (auto)  TNP     TNP 





    


 


Absolute Lymphs (auto)  TNP     TNP 





    


 


Absolute Monos (auto)  TNP     TNP 





    


 


Absolute Eos (auto)  TNP     TNP 





    


 


Absolute Basos (auto)  TNP     TNP 





    


 


Absolute Nucleated RBC  TNP     TNP 





    


 


Immature Gran %  TNP     TNP 





    


 


Immature Gran #  TNP     TNP 





    


 


POC Sodium      





    


 


Sodium    137 mEq/L mEq/L  





    (135-145)  


 


POC Potassium      





    


 


Potassium    5.0 mEq/L mEq/L  





    (3.3-5.0)  


 


POC Chloride      





    


 


Chloride    106 mEq/L mEq/L  





    ()  


 


Carbon Dioxide    18 mEq/l L mEq/l  





    (22-31)  


 


Anion Gap    13 mEq/L mEq/L  





    (6-14)  


 


POC BUN      





    


 


BUN    15 mg/dL mg/dL  





    (7-23)  


 


Creatinine    0.8 mg/dL mg/dL  





    (0.7-1.3)  


 


POC Creatinine      





    


 


Estimated GFR    > 60   





    


 


Glucose    213 mg/dL H mg/dL  





    ()  


 


POC Glucose      





    


 


Calcium    9.3 mg/dL mg/dL  





    (8.5-10.4)  


 


Specimen Hemolysis    174   





    














  10/15/18





  15:02


 


WBC  





  


 


RBC  





  


 


Hgb  





  


 


POC Hgb  17.3 gm/dL gm/dL





   (13.7-17.5) 


 


Hct  





  


 


POC Hct  51 % %





   (40-51) 


 


MCV  





  


 


MCH  





  


 


MCHC  





  


 


RDW  





  


 


Plt Count  





  


 


MPV  





  


 


Neut % (Auto)  





  


 


Lymph % (Auto)  





  


 


Mono % (Auto)  





  


 


Eos % (Auto)  





  


 


Baso % (Auto)  





  


 


Nucleat RBC Rel Count  





  


 


Absolute Neuts (auto)  





  


 


Absolute Lymphs (auto)  





  


 


Absolute Monos (auto)  





  


 


Absolute Eos (auto)  





  


 


Absolute Basos (auto)  





  


 


Absolute Nucleated RBC  





  


 


Immature Gran %  





  


 


Immature Gran #  





  


 


POC Sodium  141 mEq/L mEq/L





   (135-145) 


 


Sodium  





  


 


POC Potassium  4.0 mEq/L mEq/L





   (3.3-5.0) 


 


Potassium  





  


 


POC Chloride  104 mEq/L mEq/L





   () 


 


Chloride  





  


 


Carbon Dioxide  





  


 


Anion Gap  





  


 


POC BUN  15 mg/dL mg/dL





   (7-23) 


 


BUN  





  


 


Creatinine  





  


 


POC Creatinine  0.9 mg/dL mg/dL





   (0.7-1.3) 


 


Estimated GFR  





  


 


Glucose  





  


 


POC Glucose  232 mg/dL H mg/dL





   () 


 


Calcium  





  


 


Specimen Hemolysis  





  











Medications Given: 


 








Discontinued Medications





Sodium Chloride (Ns)  1,000 mls @ 0 mls/hr IV ONCE ONE


   PRN Reason: Wide Open


   Stop: 10/15/18 14:46


   Last Admin: 10/15/18 14:48 Dose:  1,000 mls


Sodium Chloride (Ns)  1,000 mls @ 0 mls/hr IV ONCE ONE; Wide Open


   PRN Reason: Protocol


   Stop: 10/15/18 15:35


   Last Admin: 10/15/18 15:43 Dose:  1,000 mls


Lorazepam (Ativan Injection)  0.5 mg IVP EDNOW ONE


   Stop: 10/15/18 15:35


   Last Admin: 10/15/18 15:43 Dose:  0.5 mg


Ondansetron HCl (Zofran)  4 mg IVP EDNOW ONE


   Stop: 10/15/18 14:46


   Last Admin: 10/15/18 14:48 Dose:  4 mg





Point of Care Test Results: 


 Chemistry











  10/15/18





  15:02


 


POC Sodium  141 mEq/L mEq/L





   (135-145) 


 


POC Potassium  4.0 mEq/L mEq/L





   (3.3-5.0) 


 


POC Chloride  104 mEq/L mEq/L





   () 


 


POC BUN  15 mg/dL mg/dL





   (7-23) 


 


POC Creatinine  0.9 mg/dL mg/dL





   (0.7-1.3) 


 


POC Glucose  232 mg/dL H mg/dL





   () 








 ISTAT H&H











  10/15/18





  15:02


 


POC Hgb  17.3 gm/dL gm/dL





   (13.7-17.5) 


 


POC Hct  51 % %





   (40-51) 














Departure





- Departure


Disposition: Home, Routine, Self-Care


Clinical Impression: 


Vomiting


Qualifiers:


 Vomiting type: unspecified Vomiting Intractability: intractable Nausea presence

: with nausea Qualified Code(s): R11.2 - Nausea with vomiting, unspecified





Condition: Good


Instructions:  Acute Nausea and Vomiting (ED)


Additional Instructions: 


1. Clear liquids for 24 hours.


2. Advance diet as tolerated.  I suggest the BRAT diet to start: bananas, rice, 

applesauce and toast.


3. Return for worsening symptoms, persistent vomiting, abdominal pain, any 

concerns.





Referrals: 


Abi Silverman MD [Primary Care Provider] - As per Instructions

## 2018-10-17 ENCOUNTER — HOSPITAL ENCOUNTER (EMERGENCY)
Dept: HOSPITAL 80 - FED | Age: 28
Discharge: HOME | End: 2018-10-17
Payer: COMMERCIAL

## 2018-10-17 VITALS — DIASTOLIC BLOOD PRESSURE: 53 MMHG | SYSTOLIC BLOOD PRESSURE: 116 MMHG

## 2018-10-17 DIAGNOSIS — I10: ICD-10-CM

## 2018-10-17 DIAGNOSIS — Z79.4: ICD-10-CM

## 2018-10-17 DIAGNOSIS — E11.9: ICD-10-CM

## 2018-10-17 DIAGNOSIS — G43.A1: Primary | ICD-10-CM

## 2018-10-17 DIAGNOSIS — F17.200: ICD-10-CM

## 2018-10-17 LAB — PLATELET # BLD: 282 10^3/UL (ref 150–400)

## 2018-10-17 NOTE — EDPHY
H & P


Stated Complaint: N/V since last night


Time Seen by Provider: 10/17/18 08:20


HPI/ROS: 





CHIEF COMPLAINT:  Vomiting





HISTORY OF PRESENT ILLNESS:  28-year-old male with diabetes presents with 

vomiting.  Onset of vomiting this morning, unable the tolerate oral fluids 

since then.  Associated with generalized abdominal pain.  The pain is moderate 

and cramping.  Seen by me in this emergency department 2 days ago for vomiting, 

relieved with Zofran and Ativan IV.  No recurrent vomiting until this morning.  

No fever or diarrhea.  History of multiple prior similar episodes, previously 

diagnosed with peptic ulcer disease.  9 episodes of vomiting in the past 12 

months.  He is a daily marijuana user.





REVIEW OF SYSTEMS:  complete 10 point ROS reviewed and is negative except for 

the noted elements in the HPI





Source: Patient, Family





- Personal History


Tetanus Vaccine Date: 2011





- Medical/Surgical History


Hx Asthma: No


Hx Chronic Respiratory Disease: No


Hx Diabetes: Yes


Hx Cardiac Disease: Yes


Hx Renal Disease: No


Hx Cirrhosis: No


Hx Alcoholism: No


Hx HIV/AIDS: No


Hx Splenectomy or Spleen Trauma: No


Other PMH: PMHx: DM I, HTN, HLD, depression, anxiety, panic attacks.  PSHx: 

abcess drained, wisdom tooth extraction, hernia repair





- Social History


Smoking Status: Current every day smoker


Alcohol Use: Sober


Drug Use: Marijuana





- Physical Exam


Exam: 





General Appearance:  Alert, pleasant, diaphoretic, writhing


Eyes:  Pupils equal and round, no conjunctival pallor or injection


ENT, Mouth:  Mucous membranes moist


Neck:  Normal inspection


Respiratory:  Lungs are clear to auscultation


Cardiovascular:  Regular rate and rhythm


Gastrointestinal:  Abdomen is soft, diffuse tenderness


Neurological:  A&O, nonfocal exam


Skin:  Warm and dry


Extremities:  Nontender, no pedal edema


Psychiatric:  Mood and affect normal





Constitutional: 


 Initial Vital Signs











Temperature (C)  37.0 C   10/17/18 07:46


 


Heart Rate  80   10/17/18 07:46


 


Respiratory Rate  26 H  10/17/18 07:46


 


Blood Pressure  137/103 H  10/17/18 07:46


 


O2 Sat (%)  100   10/17/18 07:46








 











O2 Delivery Mode               Room Air














Allergies/Adverse Reactions: 


 





No Known Allergies Allergy (Verified 10/15/18 14:19)


 








Home Medications: 














 Medication  Instructions  Recorded


 


Insulin Pump Cartridge  11/12/17


 


ADDERALL 15 MG TABLET  05/13/18


 


Cymbalta  05/13/18


 


Wellbutrin Sr  05/13/18


 


Klonopin  05/15/18


 


Ondansetron Odt [Zofran Odt 4 mg 4 mg PO Q4 PRN #6 tab 10/17/18





(*)]  


 


Promethazine HCl [Phenergan Rectal] 25 mg WA Q6 PRN #10 suppr 10/17/18














Medical Decision Making


ED Course/Re-evaluation: 





This patient presents with recurrent nausea and vomiting, most consistent with 

cyclic vomiting syndrome.  IV normal saline 1 L, Zofran 4 mg IV and Haldol 2.5 

mg IV given.  Blood glucose 182 and venous blood gas c/w hyperventilation and 

no acidosis. 





10:00 a.m.-feels much better, no nausea or abdominal pain.  Abdomen is soft and 

nontender.  Ready for discharge home.  Will follow up with his psychiatrist and 

with GI.  Warning signs discussed.





Differential Diagnosis: 





Differential diagnosis includes though it is not limited to appendicitis, 

cholecystitis, diverticulitis, pyelonephritis, bowel perforation, small bowel 

obstruction.








- Data Points


Laboratory Results: 


 Laboratory Results





 10/17/18 08:00 





 10/17/18 08:00 





 











  10/17/18 10/17/18 10/17/18





  08:40 08:00 08:00


 


WBC      10.54 10^3/uL H 10^3/uL





     (3.80-9.50) 


 


RBC      5.70 10^6/uL 10^6/uL





     (4.40-6.38) 


 


Hgb      16.5 g/dL g/dL





     (13.7-17.5) 


 


Hct      46.7 % %





     (40.0-51.0) 


 


MCV      81.9 fL fL





     (81.5-99.8) 


 


MCH      28.9 pg pg





     (27.9-34.1) 


 


MCHC      35.3 g/dL g/dL





     (32.4-36.7) 


 


RDW      12.8 % %





     (11.5-15.2) 


 


Plt Count      282 10^3/uL 10^3/uL





     (150-400) 


 


MPV      11.1 fL fL





     (8.7-11.7) 


 


Neut % (Auto)      80.5 % H %





     (39.3-74.2) 


 


Lymph % (Auto)      11.8 % L %





     (15.0-45.0) 


 


Mono % (Auto)      6.2 % %





     (4.5-13.0) 


 


Eos % (Auto)      0.8 % %





     (0.6-7.6) 


 


Baso % (Auto)      0.4 % %





     (0.3-1.7) 


 


Nucleat RBC Rel Count      0.0 % %





     (0.0-0.2) 


 


Absolute Neuts (auto)      8.50 10^3/uL H 10^3/uL





     (1.70-6.50) 


 


Absolute Lymphs (auto)      1.24 10^3/uL 10^3/uL





     (1.00-3.00) 


 


Absolute Monos (auto)      0.65 10^3/uL 10^3/uL





     (0.30-0.80) 


 


Absolute Eos (auto)      0.08 10^3/uL 10^3/uL





     (0.03-0.40) 


 


Absolute Basos (auto)      0.04 10^3/uL 10^3/uL





     (0.02-0.10) 


 


Absolute Nucleated RBC      0.00 10^3/uL 10^3/uL





     (0-0.01) 


 


Immature Gran %      0.3 % %





     (0.0-1.1) 


 


Immature Gran #      0.03 10^3/uL 10^3/uL





     (0.00-0.10) 


 


Puncture Site  VENOUS     





    


 


Patient Temperature  37.0 DEGREES DEGREES    





    


 


VBG pH  7.47  H     





   (7.31-7.42)   


 


VBG HCO3  19 mEQ/L L mEQ/L    





   (22-26)   


 


VBG Total CO2  19 mEq/L L mEq/L    





   (21-27)   


 


VBG O2 Saturation  79 % H %    





   (65-75)   


 


VBG Base Excess  -3.1 mEq/L L mEq/L    





   (-2.5-2.5)   


 


Mixed VBG pCO2  26 mmHg L mmHg    





   (40-44)   


 


Mixed VBG pO2  44 mmHG H mmHG    





   (35-40)   


 


Sodium    140 mEq/L mEq/L  





    (135-145)  


 


Potassium    4.0 mEq/L mEq/L  





    (3.3-5.0)  


 


Chloride    103 mEq/L mEq/L  





    ()  


 


Carbon Dioxide    18 mEq/l L mEq/l  





    (22-31)  


 


Anion Gap    19 mEq/L H mEq/L  





    (6-14)  


 


BUN    11 mg/dL mg/dL  





    (7-23)  


 


Creatinine    1.0 mg/dL mg/dL  





    (0.7-1.3)  


 


Estimated GFR    > 60   





    


 


Glucose    182 mg/dL H mg/dL  





    ()  


 


Calcium    10.4 mg/dL mg/dL  





    (8.5-10.4)  


 


Total Bilirubin    1.1 mg/dL mg/dL  





    (0.1-1.4)  


 


Conjugated Bilirubin    0.3 mg/dL mg/dL  





    (0.0-0.5)  


 


Unconjugated Bilirubin    0.8 mg/dL mg/dL  





    (0.0-1.1)  


 


AST    38 IU/L IU/L  





    (17-59)  


 


ALT    44 IU/L IU/L  





    (21-72)  


 


Alkaline Phosphatase    99 IU/L IU/L  





    ()  


 


Total Protein    8.1 g/dL g/dL  





    (6.3-8.2)  


 


Albumin    5.1 g/dL H g/dL  





    (3.5-5.0)  


 


Lipase    38 IU/L IU/L  





    ()  











Medications Given: 


 








Discontinued Medications





Haloperidol Lactate (Haldol Injection)  2.5 mg IVP EDNOW ONE


   Stop: 10/17/18 08:28


   Last Admin: 10/17/18 08:32 Dose:  2.5 mg


Sodium Chloride (Ns)  1,000 mls @ 0 mls/hr IV EDNOW ONE; Wide Open


   PRN Reason: Protocol


   Stop: 10/17/18 08:21


   Last Admin: 10/17/18 08:33 Dose:  1,000 mls


Ondansetron HCl (Zofran)  4 mg IVP EDNOW ONE


   Stop: 10/17/18 08:21


   Last Admin: 10/17/18 08:32 Dose:  4 mg








Departure





- Departure


Disposition: Home, Routine, Self-Care


Clinical Impression: 


Cyclic vomiting syndrome


Qualifiers:


 Vomiting Intractability: intractable Nausea presence: with nausea Qualified 

Code(s): G43.A1 - Cyclical vomiting, intractable





Condition: Good


Instructions:  Cyclic Vomiting Syndrome (ED)


Additional Instructions: 


1. Clear liquids for 24 hours.


2. Advance diet as tolerated.  I suggest the BRAT diet to start: bananas, rice, 

applesauce and toast.


3. Return for worsening symptoms, persistent vomiting, abdominal pain, any 

concerns.


Referrals: 


Abi Silverman MD [Primary Care Provider] - As per Instructions


Shahriar Keita MD, FACG [Medical Doctor] - As per Instructions (Call to make an 

appointment.)


Prescriptions: 


Ondansetron Odt [Zofran Odt 4 mg (*)] 4 mg PO Q4 PRN #6 tab


 PRN Reason: Nausea


Promethazine HCl [Phenergan Rectal] 25 mg WA Q6 PRN #10 suppr


 PRN Reason: vomiting